# Patient Record
Sex: FEMALE
[De-identification: names, ages, dates, MRNs, and addresses within clinical notes are randomized per-mention and may not be internally consistent; named-entity substitution may affect disease eponyms.]

---

## 2021-04-26 ENCOUNTER — NURSE TRIAGE (OUTPATIENT)
Dept: OTHER | Facility: CLINIC | Age: 41
End: 2021-04-26

## 2021-04-26 NOTE — TELEPHONE ENCOUNTER
Reason for Disposition   Patient wants to be seen    Answer Assessment - Initial Assessment Questions  1. MECHANISM: \"How did the injury happen? \" (e.g., twisting injury, direct blow)       Stepped into hole my dog had dug and then twisted it. 2. ONSET: \"When did the injury happen? \" (Minutes or hours ago)       4-9-2021    3. LOCATION: \"Where is the injury located? \"       Left ankle    4. APPEARANCE of INJURY: \"What does the injury look like? \"       Swollen, no discolorations    5. WEIGHT-BEARING: \"Can you put weight on that foot? \" \"Can you walk (four steps or more)? \"        I can put weight on it, but I tend to baby it. 6. SIZE: For cuts, bruises, or swelling, ask: \"How large is it? \" (e.g., inches or centimeters;  entire joint)       Swelling. Ankle bone is not sticking out. 7. PAIN: \"Is there pain? \" If so, ask: \"How bad is the pain? \"    (e.g., Scale 1-10; or mild, moderate, severe)      Yes  7/10    8. TETANUS: For any breaks in the skin, ask: \"When was the last tetanus booster? \"      N/a    9. OTHER SYMPTOMS: \"Do you have any other symptoms? \"       Swelling,  Tingling when foot elevated for extended time and this doesn't normally occur. 10. PREGNANCY: \"Is there any chance you are pregnant? \" \"When was your last menstrual period? \"        no    Protocols used: ANKLE AND FOOT INJURY-ADULT-[place]              Brief description of triage: ankle injury    Triage indicates for patient to see HCP today. Gave caller [facility] [address]   and [facility] in [facility], [address] [facility] advice provided, patient verbalizes understanding; denies any other questions or concerns; instructed to call back for any new or worsening symptoms. Attention Provider: Thank you for allowing me to participate in the care of your patient. The patient was connected to triage in response to symptoms provided.    Please do not respond through this encounter as the response is not directed to a shared pool.